# Patient Record
Sex: FEMALE | Race: WHITE
[De-identification: names, ages, dates, MRNs, and addresses within clinical notes are randomized per-mention and may not be internally consistent; named-entity substitution may affect disease eponyms.]

---

## 2019-07-19 ENCOUNTER — HOSPITAL ENCOUNTER (OUTPATIENT)
Dept: HOSPITAL 84 - D.RAD | Age: 34
Discharge: HOME | End: 2019-07-19
Attending: SURGERY
Payer: MEDICAID

## 2019-07-19 DIAGNOSIS — K62.5: Primary | ICD-10-CM

## 2019-08-09 ENCOUNTER — HOSPITAL ENCOUNTER (OUTPATIENT)
Dept: HOSPITAL 84 - D.OPS | Age: 34
Discharge: HOME | End: 2019-08-09
Attending: SURGERY
Payer: MEDICAID

## 2019-08-09 VITALS — WEIGHT: 145.3 LBS | BODY MASS INDEX: 25.75 KG/M2 | HEIGHT: 63 IN

## 2019-08-09 VITALS — SYSTOLIC BLOOD PRESSURE: 121 MMHG | DIASTOLIC BLOOD PRESSURE: 71 MMHG

## 2019-08-09 DIAGNOSIS — K92.2: Primary | ICD-10-CM

## 2019-08-09 DIAGNOSIS — K63.5: ICD-10-CM

## 2019-08-09 DIAGNOSIS — K59.09: ICD-10-CM

## 2019-08-09 LAB
ERYTHROCYTE [DISTWIDTH] IN BLOOD BY AUTOMATED COUNT: 13.2 % (ref 11.5–14.5)
HCT VFR BLD CALC: 47.4 % (ref 36–48)
HGB BLD-MCNC: 17.2 G/DL (ref 12–16)
MCH RBC QN AUTO: 32.8 PG (ref 26–34)
MCHC RBC AUTO-ENTMCNC: 36.3 G/DL (ref 31–37)
MCV RBC: 90.5 FL (ref 80–100)
PLATELET # BLD: 257 10X3/UL (ref 130–400)
PMV BLD AUTO: 11.5 FL (ref 7.4–10.4)
RBC # BLD AUTO: 5.24 10X6/UL (ref 4–5.4)
WBC # BLD AUTO: 11.9 10X3/UL (ref 4.8–10.8)

## 2019-08-09 NOTE — OP
PATIENT NAME:  OFELIA LEWIS                           MEDICAL RECORD: [id number]
:87                                             LOCATION:D.OPS          
                                                         ADMISSION DATE:        
SURGEON:  GEETHA HERNANDEZ MD            
 
 
DATE OF OPERATION:  2019
 
REFERRING PHYSICIAN:  Dr. Ramsey of [place].
 
PREOPERATIVE DIAGNOSES:  Rectal bleeding and sigmoid colon polyp seen on barium
enema and chronic constipation.
 
POSTOPERATIVE DIAGNOSES:  Chronic constipation, normal colonoscopy aside from
retained fecal material in the sigmoid colon, likely responsible for the
appearance of the polyp on previous barium enema.
 
OPERATION PERFORMED:  Colonoscopy to cecum.
 
SURGEON:  Geetha Hernandez MD
 
ANESTHESIA:  TIVA per CRNA.
 
PREOPERATIVE NOTE:  Ms. Lewis is a 34-year-old white female patient referred
by Dr. Ramsey with a history of intermittent rectal bleeding without pain.  She
has also a history of extremely poor bowel habits with history of having bowel
movements at frequency of only once every 2 weeks and a claim that drinking
water makes her nauseated and vomit.  She had a barium enema couple of weeks
ago, which showed what was thought to be a sizable polyp in the sigmoid colon or
around the junction of the descending and sigmoid colons at the level of the
pelvic brim and she is brought today after a standard colonoscopy prep for
outpatient endoscopy.
 
Under TIVA with the patient in the lateral decubitus position, digital rectal
exam was performed and was negative.  The fiberoptic Olympus colonoscope was
introduced and advanced with minimal difficulty to the cecum and slowly
withdrawn.  There were no abnormalities identified other than the presence of
retained stool in the region of the suspected polyp.  I could not identify any
polyps or mucosal lesions at all.  The scope was removed and the patient
awakened and taken back to the outpatient department.
 
PLAN:  The patient will be discharged to home today with instructions to take
MiraLax a tablespoon in fluid of choice p.o. daily and to try to drink more
fluids.  Hopefully, she can start to drink some water and eat more fiber
containing foods and follow up with her primary care physician regarding her
chronic constipation.  I do not, at this time, plan a followup colonoscopy
unless she continues to have symptoms.
 
TRANSINT:[id number] Voice Confirmation ID: [id number] DOCUMENT ID: [id number]
 
 
 
OPERATIVE REPORT                               [id number]    OFELIA LEWIS         
 
 
                                           
                                           GEETHA HERNANDEZ MD            
 
 
 
Electronically Signed by GEETHA HERNANDEZ on 19 at 1107
 
 
 
 
 
 
 
 
 
 
 
 
 
 
 
 
 
 
 
 
 
 
 
 
 
 
 
 
 
 
 
 
 
 
 
 
 
 
 
 
 
CC: MARIBELL RAMSEY MD                                              [id number]
DICTATION DATE: 1948     :     19      [facility] 
                                                                      19
[facility]                                          
[address] [facility]                           
[place], [place]